# Patient Record
Sex: FEMALE | Race: WHITE | Employment: FULL TIME | URBAN - METROPOLITAN AREA
[De-identification: names, ages, dates, MRNs, and addresses within clinical notes are randomized per-mention and may not be internally consistent; named-entity substitution may affect disease eponyms.]

---

## 2019-12-09 LAB — MAMMOGRAPHY, EXTERNAL: NORMAL

## 2022-09-01 ENCOUNTER — OFFICE VISIT (OUTPATIENT)
Dept: OCCUPATIONAL MEDICINE | Age: 62
End: 2022-09-01

## 2022-09-01 DIAGNOSIS — R30.0 DYSURIA: Primary | ICD-10-CM

## 2022-09-01 DIAGNOSIS — N39.0 URINARY TRACT INFECTION WITHOUT HEMATURIA, SITE UNSPECIFIED: ICD-10-CM

## 2022-09-01 RX ORDER — FLUCONAZOLE 150 MG/1
150 TABLET ORAL
Qty: 2 TABLET | Refills: 0 | Status: SHIPPED | OUTPATIENT
Start: 2022-09-01 | End: 2022-09-07

## 2022-09-01 RX ORDER — ESTRADIOL 1 MG/1
1 TABLET ORAL DAILY
COMMUNITY
Start: 2021-11-17

## 2022-09-01 RX ORDER — ROSUVASTATIN CALCIUM 5 MG/1
TABLET, COATED ORAL
COMMUNITY
Start: 2022-08-09

## 2022-09-01 RX ORDER — CELECOXIB 200 MG/1
CAPSULE ORAL
COMMUNITY
Start: 2022-08-05

## 2022-09-01 RX ORDER — ASPIRIN 81 MG/1
81 TABLET ORAL 2 TIMES DAILY
COMMUNITY
Start: 2021-12-07

## 2022-09-01 RX ORDER — CHLORTHALIDONE 25 MG/1
12.5 TABLET ORAL DAILY
COMMUNITY
Start: 2021-04-06

## 2022-09-04 ASSESSMENT — ENCOUNTER SYMPTOMS
ABDOMINAL PAIN: 0
COUGH: 0
CONSTIPATION: 0
NAUSEA: 0

## 2022-09-05 NOTE — PROGRESS NOTES
PROGRESS NOTE    SUBJECTIVE:   Michelle De La Vega is a 57 y.o. female seen for a follow up visit regarding Urinary Tract Infection   The patient is a 63 y.o. female who is seen for evaluation of dysuria, frequency, urgency, abnormal smelling urine. She has had symptoms for 3 days. Patient also complains of back pain. Patient denies congestion, cough, fever, headache, rhinitis, sorethroat, stomach ache and vaginal discharge. Patient does have a history of recurrent UTI. Patient does not have a history of pyelonephritis. Past Medical History, Past Surgical History, Family history, Social History, and Medications were all reviewed with the patient today and updated as necessary. Current Outpatient Medications   Medication Sig Dispense Refill    aspirin 81 MG EC tablet Take 81 mg by mouth 2 times daily      chlorthalidone (HYGROTON) 25 MG tablet Take 12.5 mg by mouth daily      estradiol (ESTRACE) 1 MG tablet Take 1 mg by mouth daily      fluconazole (DIFLUCAN) 150 MG tablet Take 1 tablet by mouth every 72 hours for 6 days 2 tablet 0    celecoxib (CELEBREX) 200 MG capsule TAKE 1 CAPSULE (200 MG) DAILY      rosuvastatin (CRESTOR) 5 MG tablet TAKE 1 TABLET BY MOUTH NIGHTLY       No current facility-administered medications for this visit.      Allergies   Allergen Reactions    Codeine Nausea And Vomiting    Lisinopril      Other reaction(s): Unknown-Unspecified  Just felt bad     Patient Active Problem List   Diagnosis    Bloating    Eczema    Chronic cholecystitis    GERD (gastroesophageal reflux disease)     Past Medical History:   Diagnosis Date    Biliary dyskinesia     Claustrophobia     severe    Eczema     GERD (gastroesophageal reflux disease) 2013    controlled w/dexilant prn     Past Surgical History:   Procedure Laterality Date    CHOLECYSTECTOMY  4/26/13    COLONOSCOPY      HEENT  15 yrs ago    sinus surgery    HYSTERECTOMY       Family History   Problem Relation Age of Onset    Diabetes Brother Diabetes Brother     Elevated Lipids Brother     Heart Disease Brother     Diabetes Brother     Heart Disease Father     Diabetes Father     Heart Attack Brother     Heart Disease Brother     Diabetes Mother      Social History     Tobacco Use    Smoking status: Never    Smokeless tobacco: Never   Substance Use Topics    Alcohol use: Yes       Review of Systems   Constitutional:  Negative for fatigue and fever. Respiratory:  Negative for cough. Cardiovascular:  Negative for chest pain. Gastrointestinal:  Negative for abdominal pain, constipation and nausea. Genitourinary:  Positive for dysuria and frequency. Negative for flank pain, hematuria and urgency. Skin:  Negative for rash. OBJECTIVE:  There were no vitals taken for this visit. Physical Exam  Constitutional:       Appearance: Normal appearance. Cardiovascular:      Rate and Rhythm: Normal rate and regular rhythm. Pulmonary:      Effort: Pulmonary effort is normal.      Breath sounds: Normal breath sounds. Neurological:      Mental Status: She is alert. ASSESSMENT and PLAN    Davonte Enamorado was seen today for urinary tract infection. Diagnoses and all orders for this visit:    Dysuria    Urinary tract infection without hematuria, site unspecified    Other orders  -     fluconazole (DIFLUCAN) 150 MG tablet; Take 1 tablet by mouth every 72 hours for 6 days      Reviewed triggesr, prevention and hygiene. UA was normal.pt does have symptoms of yeast infection  Will treat, educated on how to take, prevention.

## 2023-05-26 ENCOUNTER — TELEPHONE (OUTPATIENT)
Dept: ORTHOPEDIC SURGERY | Age: 63
End: 2023-05-26

## 2023-05-26 NOTE — TELEPHONE ENCOUNTER
Req  POSTA    had  right   shoulder    sx  2021  will bring  records and  disc to my  attention    PAU      Dt/appts

## 2023-07-11 ENCOUNTER — OFFICE VISIT (OUTPATIENT)
Dept: ORTHOPEDIC SURGERY | Age: 63
End: 2023-07-11
Payer: COMMERCIAL

## 2023-07-11 VITALS — WEIGHT: 159 LBS | BODY MASS INDEX: 29.26 KG/M2 | HEIGHT: 62 IN

## 2023-07-11 DIAGNOSIS — M75.21 BICIPITAL TENDINITIS OF RIGHT SHOULDER: ICD-10-CM

## 2023-07-11 DIAGNOSIS — M25.511 RIGHT SHOULDER PAIN, UNSPECIFIED CHRONICITY: ICD-10-CM

## 2023-07-11 DIAGNOSIS — M19.011 DEGENERATIVE JOINT DISEASE OF RIGHT ACROMIOCLAVICULAR JOINT: ICD-10-CM

## 2023-07-11 DIAGNOSIS — G54.0 BRACHIAL PLEXOPATHY: ICD-10-CM

## 2023-07-11 DIAGNOSIS — M47.812 CERVICAL SPONDYLOSIS: Primary | ICD-10-CM

## 2023-07-11 PROCEDURE — 99204 OFFICE O/P NEW MOD 45 MIN: CPT | Performed by: ORTHOPAEDIC SURGERY

## 2023-07-11 RX ORDER — METOPROLOL SUCCINATE 25 MG/1
12.5 TABLET, EXTENDED RELEASE ORAL DAILY
COMMUNITY
Start: 2023-05-05

## 2023-07-11 RX ORDER — ATORVASTATIN CALCIUM 10 MG/1
5 TABLET, FILM COATED ORAL DAILY
COMMUNITY
Start: 2022-09-07

## 2023-07-11 RX ORDER — DIAZEPAM 2 MG/1
TABLET ORAL
Qty: 1 TABLET | Refills: 0 | Status: SHIPPED | OUTPATIENT
Start: 2023-07-11 | End: 2023-07-11

## 2023-07-20 ENCOUNTER — HOSPITAL ENCOUNTER (OUTPATIENT)
Dept: MRI IMAGING | Age: 63
Discharge: HOME | End: 2023-07-20
Attending: ORTHOPAEDIC SURGERY
Payer: COMMERCIAL

## 2023-07-20 DIAGNOSIS — M47.812 CERVICAL SPONDYLOSIS: ICD-10-CM

## 2023-07-20 PROCEDURE — 72141 MRI NECK SPINE W/O DYE: CPT

## 2023-08-02 ENCOUNTER — PROCEDURE VISIT (OUTPATIENT)
Dept: NEUROLOGY | Age: 63
End: 2023-08-02
Payer: COMMERCIAL

## 2023-08-02 VITALS — OXYGEN SATURATION: 98 % | BODY MASS INDEX: 29.81 KG/M2 | HEART RATE: 84 BPM | WEIGHT: 163 LBS

## 2023-08-02 DIAGNOSIS — M25.511 CHRONIC RIGHT SHOULDER PAIN: ICD-10-CM

## 2023-08-02 DIAGNOSIS — G89.29 CHRONIC RIGHT SHOULDER PAIN: ICD-10-CM

## 2023-08-02 DIAGNOSIS — R20.2 NUMBNESS AND TINGLING OF RIGHT ARM: Primary | ICD-10-CM

## 2023-08-02 DIAGNOSIS — M54.2 NECK PAIN: ICD-10-CM

## 2023-08-02 DIAGNOSIS — G56.01 MILD CARPAL TUNNEL SYNDROME OF RIGHT WRIST: ICD-10-CM

## 2023-08-02 DIAGNOSIS — R20.0 NUMBNESS AND TINGLING OF RIGHT ARM: Primary | ICD-10-CM

## 2023-08-02 PROCEDURE — 95886 MUSC TEST DONE W/N TEST COMP: CPT | Performed by: PSYCHIATRY & NEUROLOGY

## 2023-08-02 PROCEDURE — 95913 NRV CNDJ TEST 13/> STUDIES: CPT | Performed by: PSYCHIATRY & NEUROLOGY

## 2023-08-02 NOTE — PROGRESS NOTES
EMG/Nerve Conduction Study Procedure Note  [address] [place] [address], [address] [place] [address],[address]   [phone]      Hx:    Exam:     60 y.o.   RHM W  female  right UE EMG for poss plex etc... EMG. No atrophy. Pain shoulder. Ref[de-identified]  Dr Garcia Cirri[de-identified] [facility][de-identified]   5'3\"            Summary               needle EMG of muscles right upper extremity as noted below with conduction velocity testing. Controlled environmental factors / EMG lab. Temperature. NCV : sensory segments:    Slightly slowed right median sensory conductions with normal right ulnar and radial sensory nerve conductions. NCV transcarpal sensory segments:     Abnormal = right median transcarpal slowing SCV segment with normal transcarpal ulnar segments and prolonged peak latencies at 0.56 ms (UL = 0.20 ms). Antebrachial cutaneous sensory nerve conductions:    Normal bilateral antebrachial cutaneous segments both lateral and medial SCV. NCV Motor MCV segments:     Normal right median and ulnar MCV. Motor nerve conduction = deltoid and suprascapular nerves:::     Normal deltoid MCV and suprascapular MCV. F-wave studies:         Normal F waves of the right median and ulnar nerves. NEEDLE EMG:   Tested muscles[de-identified]   Right FDI APB ADM EDC triceps deltoid biceps supraspinatus and infraspinatus muscles = = normal equal  Normal insertional activity and interference pattern/recruitment. No fasciculations fibrillations positive sharp waves. Normal MUP. No BSS AP. No giant MUP. No myotonia. No upper motor neuron sign. INTERPRETATION:    THESE FINDINGS ARE ELECTROPHYSIOLOGIC EVIDENCE OF VERY EARLY MEDIAN NERVE ENTRAPMENT ON THE RIGHT. NO OTHER NEUROPATHY. NO PLEXOPATHY OR RADICULOPATHY. NO DENERVATION. No myopathy myotonia or fasciculations. CONCLUSION:      Compatible with very early right carpal tunnel syndrome sensory segments only. Procedure Details:       Correlates with clinical history.   Examination

## 2023-08-16 ENCOUNTER — OFFICE VISIT (OUTPATIENT)
Dept: ORTHOPEDIC SURGERY | Age: 63
End: 2023-08-16

## 2023-08-16 DIAGNOSIS — M75.21 BICIPITAL TENDINITIS OF RIGHT SHOULDER: ICD-10-CM

## 2023-08-16 DIAGNOSIS — G56.01 CARPAL TUNNEL SYNDROME OF RIGHT WRIST: ICD-10-CM

## 2023-08-16 DIAGNOSIS — M75.01 ADHESIVE CAPSULITIS OF RIGHT SHOULDER: Primary | ICD-10-CM

## 2023-08-16 DIAGNOSIS — M47.812 CERVICAL SPONDYLOSIS: ICD-10-CM

## 2023-08-16 DIAGNOSIS — M72.0 DUPUYTREN'S CONTRACTURE OF RIGHT HAND: ICD-10-CM

## 2023-08-16 NOTE — PROGRESS NOTES
motion with bilateral trapezial tenderness  No scapular winging    The left shoulder has 0 to 180 degrees of active and 0 to 180 degrees passive forward elevation. Mild overhead pain  Internal rotation is to T6. External rotation is to 60 degrees at the side. In the 90 degree abducted position 90 degrees of external and 90 degrees internal rotation  The AC joint is non-tender  SC joint is non-tender. Greater tuberosity is non-tender. negative biceps  Negative O'Briens sign  negative lift-off sign  Negative belly press sign  Negative bear huggers sign  negative drop sign  negative hornblower's sign  No posterior glenohumeral joint line tenderness. No evident excessive external rotation  Rotator cuff strength is 5/5.  negative external rotation stress test.   Negative empty can sign  There is no evident anterior or posterior apprehension with a negative sulcus sign. No instability  negative external and internal Rotation lag sign  Neurovascularly intact. The right shoulder has well-healed portals  The right shoulder has 0 to 150 degrees of active and 0 to 160 degrees passive forward elevation. Moderate overhead pain and pain at end range of motion  Internal rotation is to L1. External rotation is to 60 degrees at the side. In the 90 degree abducted position 70 degrees of external and 60 degrees internal rotation  The AC joint is markedly point tender  SC joint is non-tender. Greater tuberosity is tender. Positive bicipital stress test with bicipital asymmetry without a geovany Kamar sign  Pain at the biceps muscle belly  Marked pain with resisted supination  Negative O'Briens sign  negative lift-off sign  Negative belly press sign  Negative bear huggers sign  negative drop sign  negative hornblower's sign  No posterior glenohumeral joint line tenderness.    No evident excessive external rotation  Rotator cuff strength is 5/5.  negative external rotation stress test.   Negative empty can

## 2023-10-04 ENCOUNTER — OFFICE VISIT (OUTPATIENT)
Dept: ORTHOPEDIC SURGERY | Age: 63
End: 2023-10-04
Payer: COMMERCIAL

## 2023-10-04 DIAGNOSIS — G56.01 CARPAL TUNNEL SYNDROME OF RIGHT WRIST: ICD-10-CM

## 2023-10-04 DIAGNOSIS — M54.42 ACUTE BILATERAL LOW BACK PAIN WITH BILATERAL SCIATICA: ICD-10-CM

## 2023-10-04 DIAGNOSIS — M47.812 CERVICAL SPONDYLOSIS: ICD-10-CM

## 2023-10-04 DIAGNOSIS — M75.21 BICIPITAL TENDINITIS OF RIGHT SHOULDER: ICD-10-CM

## 2023-10-04 DIAGNOSIS — M54.41 ACUTE BILATERAL LOW BACK PAIN WITH BILATERAL SCIATICA: ICD-10-CM

## 2023-10-04 DIAGNOSIS — M72.0 DUPUYTREN'S CONTRACTURE OF RIGHT HAND: ICD-10-CM

## 2023-10-04 DIAGNOSIS — M75.01 ADHESIVE CAPSULITIS OF RIGHT SHOULDER: Primary | ICD-10-CM

## 2023-10-04 PROCEDURE — 99212 OFFICE O/P EST SF 10 MIN: CPT | Performed by: ORTHOPAEDIC SURGERY

## 2023-10-04 NOTE — PROGRESS NOTES
Name: Kendrick Rogers  YOB: 1960  Gender: female  MRN: [id number]          HPI: Kendrick Rogers is a 58 y.o. right-hand-dominant female seen for a self-referred second opinion for right shoulder, neck, right upper extremity paresthesias but also scapular and left shoulder pain. She has a history of hypertension, hypercholesterolemia and an arrhythmia presumably tachycardia. She noted the onset of right shoulder pain in the fall 2021. No injury. She was seen by Dr. Priscilla Ames. Ultimately because of persistent right shoulder pain she was taken to the operative suite on 12/7/2021 she underwent an arthroscopy of the right shoulder ASD, ADCR, lysis of adhesions, extensive debridement of the glenohumeral joint for adhesive capsulitis and a biceps tenotomy. Her postop course was complicated by persistent right shoulder pain but also neck pain some left shoulder pain and pain down her arm. She denies any evident infection. She was treated postop with extensive physical therapy and multiple right shoulder injections. The injections did help. She has also been to pain management and has had cervical ALEX which has helped. I do not have all of those records. She still complains of persistent bilateral shoulder pain right greater than left. She is here today for second opinion. She complains of right shoulder pain and stiffness. Pain in the region of the biceps. Pain that goes towards her neck. She complains of tingling in the right hand. She also has a nodule in her right hand. Her  is sick. Turns and notes that her right shoulder is doing better. Her biceps is still sore but she is doing much better. Her  is sick and has been in the hospital.  She has been sitting in the hospital chair and has developed low back pain with pain radiating down both legs.   She denies any bowel or bladder dysfunction    ROS/Meds/PSH/PMH/FH/SH: A ten system review of systems was performed and is

## 2023-11-15 ENCOUNTER — OFFICE VISIT (OUTPATIENT)
Dept: ORTHOPEDIC SURGERY | Age: 63
End: 2023-11-15
Payer: COMMERCIAL

## 2023-11-15 DIAGNOSIS — M54.41 ACUTE BILATERAL LOW BACK PAIN WITH BILATERAL SCIATICA: ICD-10-CM

## 2023-11-15 DIAGNOSIS — M72.0 DUPUYTREN'S CONTRACTURE OF RIGHT HAND: ICD-10-CM

## 2023-11-15 DIAGNOSIS — G56.01 CARPAL TUNNEL SYNDROME OF RIGHT WRIST: ICD-10-CM

## 2023-11-15 DIAGNOSIS — M47.812 CERVICAL SPONDYLOSIS: ICD-10-CM

## 2023-11-15 DIAGNOSIS — M75.01 ADHESIVE CAPSULITIS OF RIGHT SHOULDER: Primary | ICD-10-CM

## 2023-11-15 DIAGNOSIS — M75.21 BICIPITAL TENDINITIS OF RIGHT SHOULDER: ICD-10-CM

## 2023-11-15 DIAGNOSIS — M54.42 ACUTE BILATERAL LOW BACK PAIN WITH BILATERAL SCIATICA: ICD-10-CM

## 2023-11-15 PROCEDURE — 99212 OFFICE O/P EST SF 10 MIN: CPT | Performed by: ORTHOPAEDIC SURGERY

## 2023-11-15 NOTE — PROGRESS NOTES
Name: Han Nagel  YOB: 1960  Gender: female  MRN: [id number]          HPI: Han Nagel is a 58 y.o. right-hand-dominant female seen for a self-referred second opinion for right shoulder, neck, right upper extremity paresthesias but also scapular and left shoulder pain. She has a history of hypertension, hypercholesterolemia and an arrhythmia presumably tachycardia. She noted the onset of right shoulder pain in the fall 2021. No injury. She was seen by Dr. Loren Womack. Ultimately because of persistent right shoulder pain she was taken to the operative suite on 12/7/2021 she underwent an arthroscopy of the right shoulder ASD, ADCR, lysis of adhesions, extensive debridement of the glenohumeral joint for adhesive capsulitis and a biceps tenotomy. Her postop course was complicated by persistent right shoulder pain but also neck pain some left shoulder pain and pain down her arm. She denies any evident infection. She was treated postop with extensive physical therapy and multiple right shoulder injections. The injections did help. She has also been to pain management and has had cervical ALEX which has helped. I do not have all of those records. She still complains of persistent bilateral shoulder pain right greater than left. She is here today for second opinion. She complains of right shoulder pain and stiffness. Pain in the region of the biceps. Pain that goes towards her neck. She complains of tingling in the right hand. She also has a nodule in her right hand. Her  is sick. Turns and notes that her right shoulder is doing better. Her biceps is still sore but she is doing much better. Her  is sick and has been in the hospital.  She has been sitting in the hospital chair and has developed low back pain with pain radiating down both legs. She denies any bowel or bladder dysfunction. She returns and is doing much better.   She still has neck stiffness and some

## 2024-07-23 ENCOUNTER — OFFICE VISIT (OUTPATIENT)
Dept: SLEEP MEDICINE | Age: 64
End: 2024-07-23
Payer: COMMERCIAL

## 2024-07-23 VITALS
DIASTOLIC BLOOD PRESSURE: 82 MMHG | HEIGHT: 61 IN | OXYGEN SATURATION: 94 % | WEIGHT: 166 LBS | TEMPERATURE: 97.5 F | BODY MASS INDEX: 31.34 KG/M2 | RESPIRATION RATE: 19 BRPM | HEART RATE: 85 BPM | SYSTOLIC BLOOD PRESSURE: 136 MMHG

## 2024-07-23 DIAGNOSIS — R00.0 RACING HEART BEAT: ICD-10-CM

## 2024-07-23 DIAGNOSIS — R29.818 SUSPECTED SLEEP APNEA: Primary | ICD-10-CM

## 2024-07-23 DIAGNOSIS — G47.8 NON-RESTORATIVE SLEEP: ICD-10-CM

## 2024-07-23 DIAGNOSIS — E66.9 OBESITY (BMI 30.0-34.9): ICD-10-CM

## 2024-07-23 DIAGNOSIS — R06.83 SNORING: ICD-10-CM

## 2024-07-23 DIAGNOSIS — R51.9 MORNING HEADACHE: ICD-10-CM

## 2024-07-23 DIAGNOSIS — G25.81 RLS (RESTLESS LEGS SYNDROME): ICD-10-CM

## 2024-07-23 DIAGNOSIS — G47.00 PERSISTENT DISORDER OF INITIATING OR MAINTAINING SLEEP: ICD-10-CM

## 2024-07-23 PROBLEM — E66.811 OBESITY (BMI 30.0-34.9): Status: ACTIVE | Noted: 2024-07-23

## 2024-07-23 PROCEDURE — 99203 OFFICE O/P NEW LOW 30 MIN: CPT | Performed by: NURSE PRACTITIONER

## 2024-07-23 NOTE — PATIENT INSTRUCTIONS
In lab sleep study ordered for evaluation of possible MALVIN and possible RLS  Recommendations as above  Follow-up after sleep study or 3 months after starting CPAP or sooner if needed

## 2024-07-23 NOTE — PROGRESS NOTES
Pastry, etc.  Increase fruits, vegetables, and lean meats e.g. Turkey, chicken or game meat. Patient is aware the goal is to consume less than 2000 nikos/day, since patient is a nondiabetic.    We discussed using the [employer] LOSE IT to count calories. Patient can police themselves.     If the future, if still having issues can use a more regimented diet e.g. [place], Atkins, Optavia, etc. Clinical correlation suggested.               PLAN:    In lab sleep study ordered for evaluation of possible MALVIN and possible RLS  Recommendations as above  Follow-up after sleep study or 3 months after starting CPAP or sooner if needed         Orders Placed This Encounter   Procedures    Ambulatory Referral to Sleep Studies     Referral Priority:   Routine     Referral Type:   Consult for Advice and Opinion     Referral Reason:   Specialty Services Required     Number of Visits Requested:   1              Collaborating Physician: Dr. Chavira     Today's office visit was spent  reviewing test results, prognosis, importance of compliance, education about disease process, benefits of medications, instructions for management of acute flare-ups, and follow up plans.  Total time spent with patient was 35 minutes.    Say Chavarria APRN - CNP  Electronically signed

## 2024-08-19 ENCOUNTER — HOSPITAL ENCOUNTER (OUTPATIENT)
Dept: SLEEP CENTER | Age: 64
Discharge: HOME | End: 2024-08-22
Payer: COMMERCIAL

## 2024-08-19 PROCEDURE — 95810 POLYSOM 6/> YRS 4/> PARAM: CPT

## 2025-01-13 ENCOUNTER — OFFICE VISIT (OUTPATIENT)
Dept: SLEEP MEDICINE | Age: 65
End: 2025-01-13
Payer: MEDICARE

## 2025-01-13 VITALS
BODY MASS INDEX: 28.17 KG/M2 | OXYGEN SATURATION: 97 % | WEIGHT: 159 LBS | HEART RATE: 82 BPM | SYSTOLIC BLOOD PRESSURE: 123 MMHG | DIASTOLIC BLOOD PRESSURE: 81 MMHG | RESPIRATION RATE: 18 BRPM | TEMPERATURE: 97.6 F | HEIGHT: 63 IN

## 2025-01-13 DIAGNOSIS — R00.0 RACING HEART BEAT: ICD-10-CM

## 2025-01-13 DIAGNOSIS — K21.00 GASTROESOPHAGEAL REFLUX DISEASE WITH ESOPHAGITIS, UNSPECIFIED WHETHER HEMORRHAGE: ICD-10-CM

## 2025-01-13 DIAGNOSIS — G47.8 NON-RESTORATIVE SLEEP: ICD-10-CM

## 2025-01-13 DIAGNOSIS — R06.83 SNORING: Primary | ICD-10-CM

## 2025-01-13 PROCEDURE — 1090F PRES/ABSN URINE INCON ASSESS: CPT | Performed by: INTERNAL MEDICINE

## 2025-01-13 PROCEDURE — 3017F COLORECTAL CA SCREEN DOC REV: CPT | Performed by: INTERNAL MEDICINE

## 2025-01-13 PROCEDURE — G8427 DOCREV CUR MEDS BY ELIG CLIN: HCPCS | Performed by: INTERNAL MEDICINE

## 2025-01-13 PROCEDURE — 1123F ACP DISCUSS/DSCN MKR DOCD: CPT | Performed by: INTERNAL MEDICINE

## 2025-01-13 PROCEDURE — G8400 PT W/DXA NO RESULTS DOC: HCPCS | Performed by: INTERNAL MEDICINE

## 2025-01-13 PROCEDURE — G2211 COMPLEX E/M VISIT ADD ON: HCPCS | Performed by: INTERNAL MEDICINE

## 2025-01-13 PROCEDURE — G8417 CALC BMI ABV UP PARAM F/U: HCPCS | Performed by: INTERNAL MEDICINE

## 2025-01-13 PROCEDURE — 99215 OFFICE O/P EST HI 40 MIN: CPT | Performed by: INTERNAL MEDICINE

## 2025-01-13 PROCEDURE — 1036F TOBACCO NON-USER: CPT | Performed by: INTERNAL MEDICINE

## 2025-01-13 RX ORDER — EZETIMIBE 10 MG/1
TABLET ORAL
COMMUNITY
Start: 2024-09-25

## 2025-01-13 RX ORDER — ESOMEPRAZOLE MAGNESIUM 20 MG/1
20 GRANULE, DELAYED RELEASE ORAL DAILY
COMMUNITY

## 2025-01-13 RX ORDER — FAMOTIDINE 40 MG/1
TABLET, FILM COATED ORAL
COMMUNITY

## 2025-01-13 ASSESSMENT — SLEEP AND FATIGUE QUESTIONNAIRES
HOW LIKELY ARE YOU TO NOD OFF OR FALL ASLEEP WHEN YOU ARE A PASSENGER IN A CAR FOR AN HOUR WITHOUT A BREAK: WOULD NEVER DOZE
HOW LIKELY ARE YOU TO NOD OFF OR FALL ASLEEP WHILE SITTING AND TALKING TO SOMEONE: WOULD NEVER DOZE
HOW LIKELY ARE YOU TO NOD OFF OR FALL ASLEEP WHILE SITTING INACTIVE IN A PUBLIC PLACE: WOULD NEVER DOZE
HOW LIKELY ARE YOU TO NOD OFF OR FALL ASLEEP WHILE WATCHING TV: SLIGHT CHANCE OF DOZING
HOW LIKELY ARE YOU TO NOD OFF OR FALL ASLEEP IN A CAR, WHILE STOPPED FOR A FEW MINUTES IN TRAFFIC: WOULD NEVER DOZE
ESS TOTAL SCORE: 3
HOW LIKELY ARE YOU TO NOD OFF OR FALL ASLEEP WHILE SITTING QUIETLY AFTER LUNCH WITHOUT ALCOHOL: SLIGHT CHANCE OF DOZING
HOW LIKELY ARE YOU TO NOD OFF OR FALL ASLEEP WHILE SITTING AND READING: SLIGHT CHANCE OF DOZING
HOW LIKELY ARE YOU TO NOD OFF OR FALL ASLEEP WHILE LYING DOWN TO REST IN THE AFTERNOON WHEN CIRCUMSTANCES PERMIT: WOULD NEVER DOZE